# Patient Record
Sex: MALE | Race: BLACK OR AFRICAN AMERICAN | NOT HISPANIC OR LATINO | Employment: UNEMPLOYED | URBAN - METROPOLITAN AREA
[De-identification: names, ages, dates, MRNs, and addresses within clinical notes are randomized per-mention and may not be internally consistent; named-entity substitution may affect disease eponyms.]

---

## 2018-09-18 PROBLEM — I10 HYPERTENSION: Status: ACTIVE | Noted: 2018-09-18

## 2020-07-09 ENCOUNTER — LAB VISIT (OUTPATIENT)
Dept: PRIMARY CARE CLINIC | Facility: OTHER | Age: 55
End: 2020-07-09
Attending: INTERNAL MEDICINE
Payer: MEDICAID

## 2020-07-09 DIAGNOSIS — Z03.818 ENCOUNTER FOR OBSERVATION FOR SUSPECTED EXPOSURE TO OTHER BIOLOGICAL AGENTS RULED OUT: ICD-10-CM

## 2020-07-09 PROCEDURE — U0003 INFECTIOUS AGENT DETECTION BY NUCLEIC ACID (DNA OR RNA); SEVERE ACUTE RESPIRATORY SYNDROME CORONAVIRUS 2 (SARS-COV-2) (CORONAVIRUS DISEASE [COVID-19]), AMPLIFIED PROBE TECHNIQUE, MAKING USE OF HIGH THROUGHPUT TECHNOLOGIES AS DESCRIBED BY CMS-2020-01-R: HCPCS

## 2020-07-12 LAB — SARS-COV-2 RNA RESP QL NAA+PROBE: NEGATIVE

## 2022-01-06 ENCOUNTER — OFFICE VISIT (OUTPATIENT)
Dept: URGENT CARE | Facility: CLINIC | Age: 57
End: 2022-01-06
Payer: OTHER GOVERNMENT

## 2022-01-06 VITALS
SYSTOLIC BLOOD PRESSURE: 186 MMHG | DIASTOLIC BLOOD PRESSURE: 92 MMHG | RESPIRATION RATE: 16 BRPM | HEIGHT: 69 IN | OXYGEN SATURATION: 100 % | WEIGHT: 177 LBS | HEART RATE: 105 BPM | TEMPERATURE: 99 F | BODY MASS INDEX: 26.22 KG/M2

## 2022-01-06 DIAGNOSIS — K04.7 DENTAL ABSCESS: Primary | ICD-10-CM

## 2022-01-06 DIAGNOSIS — T78.3XXA ANGIOEDEMA, INITIAL ENCOUNTER: ICD-10-CM

## 2022-01-06 PROCEDURE — 99214 PR OFFICE/OUTPT VISIT, EST, LEVL IV, 30-39 MIN: ICD-10-PCS | Mod: S$GLB,,, | Performed by: FAMILY MEDICINE

## 2022-01-06 PROCEDURE — 99214 OFFICE O/P EST MOD 30 MIN: CPT | Mod: S$GLB,,, | Performed by: FAMILY MEDICINE

## 2022-01-06 RX ORDER — AMOXICILLIN AND CLAVULANATE POTASSIUM 875; 125 MG/1; MG/1
1 TABLET, FILM COATED ORAL 2 TIMES DAILY
Qty: 20 TABLET | Refills: 0 | Status: SHIPPED | OUTPATIENT
Start: 2022-01-06 | End: 2022-01-16

## 2022-01-06 RX ORDER — NAPROXEN 500 MG/1
500 TABLET ORAL 2 TIMES DAILY WITH MEALS
Qty: 20 TABLET | Refills: 0 | Status: SHIPPED | OUTPATIENT
Start: 2022-01-06

## 2022-01-06 RX ORDER — PREDNISONE 20 MG/1
40 TABLET ORAL DAILY
Qty: 10 TABLET | Refills: 0 | Status: SHIPPED | OUTPATIENT
Start: 2022-01-06 | End: 2022-01-11

## 2022-01-06 NOTE — LETTER
January 6, 2022  Ambrosio Ocampo  [address] [place] [address]                [place] [facility]  [address], [address]  [place] [address]  Phone: [phone]  Fax: [phone] Ambrosio Ocampo was seen and treated in our Urgent Care department on 1/6/2022. He may return to work in 2 - 3 days.      If you have any questions or concerns, please don't hesitate to call.        Sincerely,        Edouard Alcala MD

## 2022-01-06 NOTE — PROGRESS NOTES
"Subjective:       Patient ID: Ambrosio Ocampo is a 56 y.o. male.    Vitals:  height is 5' 9" (1.753 m) and weight is 80.3 kg (177 lb). His oral temperature is 98.6 °F (37 °C). His blood pressure is 186/92 (abnormal) and his pulse is 105. His respiration is 16 and oxygen saturation is 100%.     Chief Complaint: Facial Swelling    Edema  This is a new problem. The current episode started today. The problem occurs constantly. The problem has been gradually worsening. Associated symptoms include neck pain and numbness (lip is numb). Pertinent negatives include no abdominal pain, chest pain, chills, congestion, coughing, fatigue, fever, headaches, sore throat, swollen glands (painful ) or vomiting. Nothing aggravates the symptoms. He has tried nothing for the symptoms.       Constitution: Negative. Negative for chills, fatigue and fever.   HENT: Negative.  Negative for congestion and sore throat.    Neck: Positive for neck pain.   Cardiovascular: Negative.  Negative for chest pain.   Eyes: Negative.    Respiratory: Negative.  Negative for cough.    Gastrointestinal: Negative.  Negative for abdominal pain and vomiting.   Endocrine: negative.   Genitourinary: Negative.    Skin: Negative.    Allergic/Immunologic: Negative.    Neurological: Positive for numbness (lip is numb). Negative for headaches.   Hematologic/Lymphatic: Negative.    Psychiatric/Behavioral: Negative.        Objective:      Physical Exam   Constitutional: He is oriented to person, place, and time. He appears well-developed and well-nourished. He is cooperative.  Non-toxic appearance. He does not have a sickly appearance. He does not appear ill. No distress.   HENT:   Head: Normocephalic and atraumatic.       Ears:   Right Ear: Hearing, tympanic membrane, external ear and ear canal normal.   Left Ear: Hearing, tympanic membrane, external ear and ear canal normal.   Nose: Nose normal. No mucosal edema, rhinorrhea or nasal deformity. No epistaxis. " Right sinus exhibits no maxillary sinus tenderness and no frontal sinus tenderness. Left sinus exhibits no maxillary sinus tenderness and no frontal sinus tenderness.   Mouth/Throat: Uvula is midline, oropharynx is clear and moist and mucous membranes are normal. No trismus in the jaw. Normal dentition. No uvula swelling. No oropharyngeal exudate, posterior oropharyngeal edema or posterior oropharyngeal erythema.   Eyes: Conjunctivae and lids are normal. No scleral icterus.   Neck: Trachea normal and phonation normal. Neck supple. No edema present. No erythema present. No neck rigidity present.   Cardiovascular: Normal rate, regular rhythm, normal heart sounds, intact distal pulses and normal pulses.   Pulmonary/Chest: Effort normal and breath sounds normal. No respiratory distress. He has no decreased breath sounds. He has no rhonchi.   Abdominal: Normal appearance.   Musculoskeletal: Normal range of motion.         General: No deformity or edema. Normal range of motion.   Neurological: He is alert and oriented to person, place, and time. He exhibits normal muscle tone. Coordination normal.   Skin: Skin is warm, dry, intact, not diaphoretic and not pale.   Psychiatric: He has a normal mood and affect. His speech is normal and behavior is normal. Judgment and thought content normal. Cognition and memory  Nursing note and vitals reviewed.        Assessment:       1. Dental abscess    2. Angioedema, initial encounter          Plan:         Dental abscess    Angioedema, initial encounter  -     predniSONE (DELTASONE) 20 MG tablet; Take 2 tablets (40 mg total) by mouth once daily. for 5 days  Dispense: 10 tablet; Refill: 0  -     amoxicillin-clavulanate 875-125mg (AUGMENTIN) 875-125 mg per tablet; Take 1 tablet by mouth 2 (two) times daily. for 10 days  Dispense: 20 tablet; Refill: 0  -     naproxen (NAPROSYN) 500 MG tablet; Take 1 tablet (500 mg total) by mouth 2 (two) times daily with meals.  Dispense: 20 tablet;  Refill: 0     Please drink plenty of fluids.  Please get plenty of rest.  Please return here or go to the Emergency Department for any concerns or worsening of condition.    If you were prescribed antibiotics, please take them to completion.  If you were prescribed a narcotic medication, do not drive or operate heavy equipment or machinery while taking these medications.    Ice Packs to face and jaw will help reduce swelling and pain.    If not allergic, please take over the counter Tylenol (Acetaminophen) and/or Motrin (Ibuprofen) as directed for control of pain and/or fever.    You will need to see a dentist for further treatment of this problem.  Emergency Dentistry is available and listed below.    Dentist - Emergency [place]    Dr. Esequiel Casey  DDS  [address], [place]  [address] [phone]    Dentist - Emergency    Dr. Perera DDS  [address].  [place], [place]    [phone]      Please follow up with your primary care doctor or specialist as needed.  Primary Doctor No  None    You must understand that you have received treatment at an Urgent Care facility only, and that you may be  released before all of your medical problems are known or treated. Urgent Care facilities are not equipped to  handle life threatening emergencies. It is recommended that you seek care at an Emergency Department for  further evaluation of worsening or concerning symptoms, or possibly life threatening conditions as  discussed.

## 2022-01-07 NOTE — PATIENT INSTRUCTIONS
STOP LISINOPRIL, CONTACT YOUR DOCTOR TOMORROW FOR CHANGE OF MEDICATION.    Please drink plenty of fluids.  Please get plenty of rest.  Please return here or go to the Emergency Department for any concerns or worsening of condition.    If you were prescribed antibiotics, please take them to completion.  If you were prescribed a narcotic medication, do not drive or operate heavy equipment or machinery while taking these medications.    Ice Packs to face and jaw will help reduce swelling and pain.    If not allergic, please take over the counter Tylenol (Acetaminophen) and/or Motrin (Ibuprofen) as directed for control of pain and/or fever.    You will need to see a dentist for further treatment of this problem.  Emergency Dentistry is available and listed below.    Dentist - Emergency [place]    Dr. Esequiel Casey  DDS  [address], [place]  [address] [phone]    Dentist - Emergency    Dr. Perera DDS  [address], [place]    [phone]        Please follow up with your primary care doctor or specialist as needed.  Primary Doctor No  None    You must understand that you have received treatment at an Urgent Care facility only, and that you may be  released before all of your medical problems are known or treated. Urgent Care facilities are not equipped to  handle life threatening emergencies. It is recommended that you seek care at an Emergency Department for  further evaluation of worsening or concerning symptoms, or possibly life threatening conditions as  discussed.    Dental Abscess  An abscess is a sac of pus. A dental abscess forms when a tooth or the tissue around it becomes infected with bacteria. The bacteria can enter through a cavity or a crack in a tooth. It can also infect the gum tissue or bone around a tooth. An untreated abscess can cause the loss of the tooth. It can even spread to other parts of the body and become life-threatening.    Symptoms of a dental abscess   Signs of  a dental abscess include:  · Toothache, often severe  · Tooth pain with hot, cold, or pressure  · Pain in the gums, cheek, or jaw  · Bad breath or bitter taste in the mouth  · Trouble swallowing or opening the mouth  · Fever  · Swollen or enlarged glands in the neck  Diagnosing a dental abscess  An abscess is diagnosed by looking at your teeth and gums. You will be told if any tests, like dental X-rays, are needed.  Treating a dental abscess  Treatments for a dental abscess may include the following:  · Antibiotic medicines to treat the underlying infection.  · Pain relievers to help you feel more comfortable. Your health care provider may prescribe a medicine for you. Or, use over-the-counter pain relievers, like acetaminophen or ibuprofen.  · Warm saltwater rinses to soothe discomfort and help clear away pus.  · Root canal surgery if needed to save the tooth. With a root canal, the infected part of the tooth is removed. A special substance is then used to fill the empty space in the tooth.  · Drainage of the abscess if needed. Incisions are made to allow the infected material to drain from the tooth.  · Removal of the tooth in cases of severe infection that cant be treated another way.  If the infection is severe, has spread, or doesnt respond to treatment, you may need to be admitted to a hospital.        When to call the dentist  Call your dentist right away if you have any of the following:  · Fever of 100.4°F (38°C) or higher  · Increased pain, redness, drainage, or swelling in the treated area  · Swelling of the face or jawbone  · Pain that cannot be controlled with medicines   Preventing dental abscess  To prevent another abscess in the future, keep your teeth clean and healthy. Brush twice a day and floss at least once daily. See your dentist for regular tooth cleanings. And avoid sugary foods and drinks that can lead to tooth decay.  Date Last Reviewed: 7/14/2015  © 4348-9100 [employer]  [address] [place] [address]. All rights reserved. This information is not intended as a substitute for professional medical care. Always follow your healthcare professional's instructions.        Please drink plenty of fluids.  Please get plenty of rest.  Please return here or go to the Emergency Department for any concerns or worsening of condition.  If you were given wait & see antibiotics, please wait 3-5 days before taking them, and only take them if your symptoms have worsened or not improved.  If you do begin taking the antibiotics, please take them to completion.  If you were prescribed antibiotics, please take them to completion.  If you were prescribed a narcotic medication, do not drive or operate heavy equipment or machinery while taking these medications.      If not allergic, please take over the counter Tylenol (Acetaminophen) and/or Motrin (Ibuprofen) as directed for control of pain and/or fever.    Please follow up with your primary care doctor or specialist as needed.  Primary Doctor No  None    You must understand that you have received treatment at an Urgent Care facility only, and that you may be  released before all of your medical problems are known or treated. Urgent Care facilities are not equipped to  handle life threatening emergencies. It is recommended that you seek care at an Emergency Department for  further evaluation of worsening or concerning symptoms, or possibly life threatening conditions as  discussed.    Patient Education       Angioedema Discharge Instructions   About this topic   Angioedema is swelling under the skin and tissues. It is caused by a leaking blood vessel. Hives are swelling on the top of the skin. You may have both of these at the same time. Sometimes, you may only have angioedema. The swelling can happen any place on the body. Some people have swelling on the face, tongue, lips, or throat. Other people have swelling on the arms, legs, belly, and  lungs. Doctors do not always know what causes this swelling. Sometimes, it is an allergy to drugs, foods, animal fur, or bug bites. There may be something with the weather or the flowers and trees outside that is causing the swelling.  It is important to tell hives from angioedema because the care is different. Prevention is the most important step against angioedema. When the face, throat, and lungs are affected, you should see a doctor right away.  What care is needed at home?   · Ask your doctor what you need to do when you go home. Make sure you ask questions if you do not understand what the doctor says. This way you will know what you need to do.  · You doctor will give you drugs for pain and swelling. Take all the drugs as ordered by your doctor.  · During an attack, loosen tight clothing.  · If you are itchy, you may put a cool damp washcloth over the itchy part. Place an ice pack or a bag of frozen peas wrapped in a towel over the itchy part. Never put ice right on the skin. Do not leave the ice on more than 10 to 15 minutes at a time.  · If your angioedema is caused by heat, stay in a cool room with good air flow.  · Put an air purifier in your room. This may help filter the air in your room.  · Keep a list of what sets off your allergies. Stay away from them as much as possible.  ? Wear a mask when you need to go to giraldo and other crowded places if your trigger is airborne allergens.  ? When eating in a restaurant, always ask about the ingredients of a dish you want to have if your trigger for angioedema is food-related (like MSG).  · Tell close friends and companions about your condition so that they may help you if needed.  · The drugs may make you feel tired. Do not drive or operate machinery if you feel drowsy.  What follow-up care is needed?   · Your condition needs close monitoring. Your doctor may ask you to make visits to the office to check on your progress. Be sure to keep these visits.  · You  may be asked to take a different kind of heart drug if the doctor feels that was causing your problem.  · Your doctor will tell you if other tests are needed.  · Your doctor may have you go see an allergy expert called an immunologist.  · You may have to go see a dietitian. This is someone who can help you find out if certain foods are causing the angioedema.  What drugs may be needed?   The doctor may order drugs to:  · Help with pain and swelling  · Ease itching  · Fight allergies  · Fight or prevent an infection  · Help prevent a reaction  Your doctor may also switch you to different kinds of drugs for your other medical problems.  Will physical activity be limited?   If your angioedema is triggered by exercise, take your drugs before exercising.  What problems could happen?   · Breathing problems  · Heart problems  · Infection  · Angioedema returns  What can be done to prevent this health problem?   · Do not take any drugs unless ordered by your doctor.  · Check with your doctor before taking over-the-counter (OTC) drugs, health additives, or herbs.  · Do not take aspirin for a headache or other problems unless ordered by your doctor.  · Avoid things that may set off your swelling. This may include things like getting too hot, eating spicy food, or drinking alcohol.  When do I need to call the doctor?   · Swollen lips or throat  · Sudden breathing problems  · Chest pain  · Upset stomach and throwing up  · Fast heartbeat  · Fever of 100.4°F (38°C) or higher  · You are not feeling better in 2 to 3 days or you are feeling worse  Teach Back: Helping You Understand   The Teach Back Method helps you understand the information we are giving you. After you talk with the staff, tell them in your own words what you learned. This helps to make sure the staff has described each thing clearly. It also helps to explain things that may have been confusing. Before going home, make sure you can do these:  · I can tell you the  "difference between angioedema and hives.  · I can tell you what may help me feel better during an attack.  · I can tell you what I will do if I suddenly have trouble breathing.  Where can I learn more?   American Academy of Allergy Asthma & Immunology  https://www.aaaai.org/conditions-and-treatments/library/allergy-library/allergic-skin-conditions   Australasian Society of Clinical Immunology and Allergy  https://allergy.org.au/patients/skin-allergy/angioedema   World Allergy Organization  https://www.worldallergy.org/education-and-programs/education/allergic-disease-resource-center/professionals/angioedema   Last Reviewed Date   2021-03-08  Consumer Information Use and Disclaimer   This information is not specific medical advice and does not replace information you receive from your health care provider. This is only a brief summary of general information. It does NOT include all information about conditions, illnesses, injuries, tests, procedures, treatments, therapies, discharge instructions or life-style choices that may apply to you. You must talk with your health care provider for complete information about your health and treatment options. This information should not be used to decide whether or not to accept your health care providers advice, instructions or recommendations. Only your health care provider has the knowledge and training to provide advice that is right for you.  Copyright   Copyright © 2021 UpToDate, Inc. and its affiliates and/or licensors. All rights reserved.    Patient Education       Angioedema Caused by ACE Inhibitor Medicines   The Basics   Written by the doctors and editors at [employer]   What are ACE inhibitors? -- ACE inhibitors are medicines that are most often used to treat high blood pressure, but are also used for other problems. "ACE inhibitor" is short for "angiotensin-converting enzyme inhibitor." Examples of ACE inhibitors include enalapril, captopril, and lisinopril.  What " is angioedema? -- Angioedema is the medical term for swelling of the tissue under the skin. This can be caused by different things, including an allergic reaction to certain medicines. ACE inhibitors are 1 type of medicine that can cause this reaction in some people.  The swelling can happen within a few weeks of starting the medicine. But in some cases, it can happen even after months or years of taking an ACE inhibitor every day without any swelling. Doctors are not sure why this happens.  The swelling usually lasts a few days and is not itchy.  What part of the body swells? -- The swelling can happen on the lips, face, or tongue. It can also affect other parts of the body, including the intestines.  Depending on where the swelling is, it can cause different problems:  · Swelling of the tongue, mouth, and lips can be very dangerous if it makes it hard for you to breathe or swallow. You should go to the emergency department right away if you get swelling in or near your mouth or throat. If you are having trouble breathing, you might need to call for an ambulance (in the [place] and [place], dial [id number]).   In the hospital, the doctors will monitor your breathing. If the swelling is causing your throat to close, they can put a tube in your throat to help you breathe. You will stay in the hospital until the swelling goes down.  · If swelling happens in the intestines, it can cause pain, vomiting, or diarrhea.  What should I do if I take an ACE inhibitor and have swelling? -- You should:  · Stop taking the medicine, and tell your doctor.  · Never take that medicine or any other ACE inhibitor in the future.  · Make sure your doctor puts a note in your medical record that you are allergic to ACE inhibitors.  · Make sure your main doctor, other doctors and nurses who take care of you, and your pharmacy know that you should never take ACE inhibitors again.  Can the swelling happen again after I have stopped the ACE  "inhibitor? -- Yes. You might get swelling again 1 or more times within the next few months. The medicine affects your body for a while, even after you stop taking it.  If you get swelling around your mouth or tongue again, go to the emergency department so doctors can monitor your breathing.  The swelling should stop happening after a few months. If it does not, talk to your doctor. You might need to see an allergy specialist, because there may be another reason that you are still swelling. Sometimes people have swelling caused by another problem that the ACE inhibitor makes worse.  What medicine should I switch to? -- There are many other medicines for high blood pressure and heart problems that do not cause swelling. Ask your doctor what you can take instead of an ACE inhibitor. Medicines that do not cause swelling include angiotensin II receptor blockers ("ARBs"), beta blockers, and calcium channel blockers.  Remember that if you get swelling again in the first few months after stopping an ACE inhibitor, it is probably still related to the ACE inhibitor. That's because ACE inhibitors continue to affect your body for a while, even after you stop taking them.  All topics are updated as new evidence becomes available and our peer review process is complete.  This topic retrieved from [employer] on: Sep 21, 2021.  Topic 999419 Version 1.0  Release: 29.4.2 - C29.263  © 2021 UpToDate, Inc. and/or its affiliates. All rights reserved.  Consumer Information Use and Disclaimer   This information is not specific medical advice and does not replace information you receive from your health care provider. This is only a brief summary of general information. It does NOT include all information about conditions, illnesses, injuries, tests, procedures, treatments, therapies, discharge instructions or life-style choices that may apply to you. You must talk with your health care provider for complete information about your health and " treatment options. This information should not be used to decide whether or not to accept your health care provider's advice, instructions or recommendations. Only your health care provider has the knowledge and training to provide advice that is right for you. The use of this information is governed by the [employer] End User License Agreement, available at https://www.[employer]/en/solutions/[employer]/about/argenis.The use of [employer] content is governed by the [employer] Terms of Use. ©2021 [employer] All rights reserved.  Copyright   © 2021 UpToDate, Inc. and/or its affiliates. All rights reserved.

## 2023-01-12 ENCOUNTER — OFFICE VISIT (OUTPATIENT)
Dept: URGENT CARE | Facility: CLINIC | Age: 58
End: 2023-01-12
Payer: OTHER GOVERNMENT

## 2023-01-12 VITALS
HEIGHT: 69 IN | SYSTOLIC BLOOD PRESSURE: 176 MMHG | HEART RATE: 102 BPM | DIASTOLIC BLOOD PRESSURE: 97 MMHG | OXYGEN SATURATION: 96 % | TEMPERATURE: 100 F | WEIGHT: 17 LBS | BODY MASS INDEX: 2.52 KG/M2 | RESPIRATION RATE: 20 BRPM

## 2023-01-12 DIAGNOSIS — J10.1 INFLUENZA A: Primary | ICD-10-CM

## 2023-01-12 DIAGNOSIS — R05.9 COUGH, UNSPECIFIED TYPE: ICD-10-CM

## 2023-01-12 LAB
CTP QC/QA: YES
CTP QC/QA: YES
POC MOLECULAR INFLUENZA A AGN: POSITIVE
POC MOLECULAR INFLUENZA B AGN: NEGATIVE
SARS-COV-2 AG RESP QL IA.RAPID: NEGATIVE

## 2023-01-12 PROCEDURE — 99214 PR OFFICE/OUTPT VISIT, EST, LEVL IV, 30-39 MIN: ICD-10-PCS | Mod: S$GLB,,,

## 2023-01-12 PROCEDURE — 71046 XR CHEST PA AND LATERAL: ICD-10-PCS | Mod: S$GLB,,, | Performed by: RADIOLOGY

## 2023-01-12 PROCEDURE — 87502 POCT INFLUENZA A/B MOLECULAR: ICD-10-PCS | Mod: QW,S$GLB,,

## 2023-01-12 PROCEDURE — 87811 SARS CORONAVIRUS 2 ANTIGEN POCT, MANUAL READ: ICD-10-PCS | Mod: QW,S$GLB,,

## 2023-01-12 PROCEDURE — 87811 SARS-COV-2 COVID19 W/OPTIC: CPT | Mod: QW,S$GLB,,

## 2023-01-12 PROCEDURE — 99214 OFFICE O/P EST MOD 30 MIN: CPT | Mod: S$GLB,,,

## 2023-01-12 PROCEDURE — 87502 INFLUENZA DNA AMP PROBE: CPT | Mod: QW,S$GLB,,

## 2023-01-12 PROCEDURE — 71046 X-RAY EXAM CHEST 2 VIEWS: CPT | Mod: S$GLB,,, | Performed by: RADIOLOGY

## 2023-01-12 RX ORDER — OSELTAMIVIR PHOSPHATE 75 MG/1
75 CAPSULE ORAL 2 TIMES DAILY
Qty: 10 CAPSULE | Refills: 0 | Status: SHIPPED | OUTPATIENT
Start: 2023-01-12 | End: 2023-01-17

## 2023-01-12 RX ORDER — BENZONATATE 200 MG/1
200 CAPSULE ORAL 3 TIMES DAILY PRN
Qty: 30 CAPSULE | Refills: 0 | Status: SHIPPED | OUTPATIENT
Start: 2023-01-12 | End: 2023-01-22

## 2023-01-12 RX ORDER — PROMETHAZINE HYDROCHLORIDE AND DEXTROMETHORPHAN HYDROBROMIDE 6.25; 15 MG/5ML; MG/5ML
5 SYRUP ORAL EVERY 4 HOURS PRN
Qty: 180 ML | Refills: 0 | Status: CANCELLED | OUTPATIENT
Start: 2023-01-12 | End: 2023-01-22

## 2023-01-12 NOTE — PATIENT INSTRUCTIONS
You must understand that you have received treatment at an Urgent Care facility only, and that you may be  released before all of your medical problems are known or treated. Urgent Care facilities are not equipped to  handle life threatening emergencies. It is recommended that you seek care at an Emergency Department for  further evaluation of worsening or concerning symptoms, or possibly life threatening conditions as  discussed.     Strong peripheral pulses/Capillary refill less/equal to 2 seconds

## 2023-01-12 NOTE — PROGRESS NOTES
"Subjective:       Patient ID: Ambrosio Ocampo is a 57 y.o. male.    Vitals:  height is 5' 9" (1.753 m) and weight is 7.711 kg (17 lb). His oral temperature is 99.8 °F (37.7 °C). His blood pressure is 176/97 (abnormal) and his pulse is 102. His respiration is 20 and oxygen saturation is 96%.     Chief Complaint: Chest Pain    Patient complains about having chest pain on right side with deep breath, chills, headache, fever, sinus congestion, runny nose, shortness of breath and cough for 2 days.  Patient has history of hypertension, stated he took his medications this morning.    Chest Pain   This is a new problem. The current episode started in the past 7 days. Onset quality: when coughing and breathing in. The problem occurs constantly. The problem has been gradually worsening. Pain location: right sideof the chest. The pain is at a severity of 8/10. The pain is moderate. Quality: throbbing, aching. Radiates to: makes the whole body weak. Associated symptoms include a cough, a fever (unmeasured), headaches and shortness of breath (mild, able to walk without SOB). Pertinent negatives include no nausea or vomiting. The cough's precipitants include activity. Cough characteristics: yellow green. Nothing relieves the cough. The cough is worsened by activity. Treatments tried: thera flu. The treatment provided mild relief.   Pertinent negatives for past medical history include no CAD.     Constitution: Positive for chills and fever (unmeasured).        +bodyaches     HENT:  Positive for congestion and postnasal drip.    Cardiovascular:  Positive for chest pain (with deep breathing).   Respiratory:  Positive for cough and shortness of breath (mild, able to walk without SOB).    Gastrointestinal:  Negative for nausea, vomiting and diarrhea.   Neurological:  Positive for headaches.     Objective:      Physical Exam   Constitutional: He is oriented to person, place, and time. He appears well-developed. He is " cooperative.  Non-toxic appearance. He does not appear ill. No distress.      Comments:Patient is alert and able to answer questions with ease. Patient ambulated in clinic without difficulty.      HENT:   Head: Normocephalic and atraumatic.   Ears:   Right Ear: Hearing, tympanic membrane, external ear and ear canal normal.   Left Ear: Hearing, tympanic membrane, external ear and ear canal normal.   Nose: Rhinorrhea (clear) and congestion present. No mucosal edema or nasal deformity. No epistaxis. Right sinus exhibits no maxillary sinus tenderness and no frontal sinus tenderness. Left sinus exhibits no maxillary sinus tenderness and no frontal sinus tenderness.   Mouth/Throat: Uvula is midline, oropharynx is clear and moist and mucous membranes are normal. No trismus in the jaw. Normal dentition. No uvula swelling. No oropharyngeal exudate, posterior oropharyngeal edema or posterior oropharyngeal erythema.   Eyes: Conjunctivae and lids are normal. No scleral icterus.   Neck: Trachea normal and phonation normal. Neck supple. No edema present. No erythema present. No neck rigidity present.   Cardiovascular: Normal rate, regular rhythm, normal heart sounds and normal pulses.   Pulmonary/Chest: Effort normal. No respiratory distress. He has no decreased breath sounds. He has no wheezes. He has no rhonchi.         Comments: Patient able to take in deep breath. No intercostal retractions.    Abdominal: Normal appearance.   Musculoskeletal: Normal range of motion.         General: No deformity. Normal range of motion.   Neurological: He is alert and oriented to person, place, and time. He exhibits normal muscle tone. Coordination normal.   Skin: Skin is warm, dry, intact, not diaphoretic and not pale.   Psychiatric: His speech is normal and behavior is normal. Judgment and thought content normal.   Nursing note and vitals reviewed.      Results for orders placed or performed in visit on 01/12/23   SARS Coronavirus 2  Antigen, POCT Manual Read   Result Value Ref Range    SARS Coronavirus 2 Antigen Negative Negative     Acceptable Yes    POCT Influenza A/B MOLECULAR   Result Value Ref Range    POC Molecular Influenza A Ag Positive (A) Negative, Not Reported    POC Molecular Influenza B Ag Negative Negative, Not Reported     Acceptable Yes      XR CHEST PA AND LATERAL    Result Date: 1/12/2023  EXAMINATION: XR CHEST PA AND LATERAL TECHNIQUE: PA and lateral views of the chest were performed. COMPARISON: Chest radiograph 4/5/99 FINDINGS: Trachea is relatively midline and patent.The lungs are well expanded and clear, with normal appearance of pulmonary vasculature and no pleural effusion or pneumothorax. The cardiac silhouette is normal in size. The hilar and mediastinal contours are unremarkable. Osseous structures show age-related mild degenerative change and mild dextrocurvature of the thoracolumbar spine without acute process seen.     No radiographic evidence of pneumonia or other source of shortness of breath, noting that early/mild viral pneumonia or nonspecific bronchitis may be radiographically occult. Electronically signed by: Mahin Hargrove MD Date:    01/12/2023 Time:    12:31     Assessment:       1. Influenza A    2. Cough, unspecified type          Plan:         Influenza A  -     oseltamivir (TAMIFLU) 75 MG capsule; Take 1 capsule (75 mg total) by mouth 2 (two) times daily. for 5 days  Dispense: 10 capsule; Refill: 0  -     benzonatate (TESSALON) 200 MG capsule; Take 1 capsule (200 mg total) by mouth 3 (three) times daily as needed for Cough.  Dispense: 30 capsule; Refill: 0    Cough, unspecified type  -     SARS Coronavirus 2 Antigen, POCT Manual Read  -     POCT Influenza A/B MOLECULAR  -     XR CHEST PA AND LATERAL; Future; Expected date: 01/12/2023       Reviewed CXR in PACS- no acute consolidation noted or pneumothorax. No acute cardiopulmonary findings. Discussed findings with patient.      Patient's BP is elevated in clinic. Patient stated he took his BP medication this morning, unsure of which medication it was. Discussed with patient to take BP reading twice a day at home and keep a BP log. Bring log to PCP if medication needs adjustment. Close PCP follow up.    If symptoms worsen or do not improve, strict ER precautions given to patient. Patient verbalized understanding.       Discussed with patient the importance of f/u with their primary care provider. Urged to go to the ER for any worsening signs or symptoms.

## 2023-01-13 ENCOUNTER — TELEPHONE (OUTPATIENT)
Dept: URGENT CARE | Facility: CLINIC | Age: 58
End: 2023-01-13
Payer: OTHER GOVERNMENT

## 2023-01-13 NOTE — TELEPHONE ENCOUNTER
Pharmacy called stating that the pt is having trouble getting the tessalon pearls and tamiflu prescribed yesterday. The provider isn't registered with medicaid. [place] gave the okay from them to put it under their name. Pharmacist said she would try to run it through, if she runs into any problems she will give us a call back.

## 2023-03-23 ENCOUNTER — OFFICE VISIT (OUTPATIENT)
Dept: URGENT CARE | Facility: CLINIC | Age: 58
End: 2023-03-23
Payer: OTHER GOVERNMENT

## 2023-03-23 VITALS
HEART RATE: 92 BPM | OXYGEN SATURATION: 97 % | WEIGHT: 175 LBS | SYSTOLIC BLOOD PRESSURE: 181 MMHG | TEMPERATURE: 98 F | DIASTOLIC BLOOD PRESSURE: 96 MMHG | BODY MASS INDEX: 25.92 KG/M2 | RESPIRATION RATE: 18 BRPM | HEIGHT: 69 IN

## 2023-03-23 DIAGNOSIS — S93.402A SPRAIN OF LEFT ANKLE, UNSPECIFIED LIGAMENT, INITIAL ENCOUNTER: Primary | ICD-10-CM

## 2023-03-23 PROCEDURE — 99214 PR OFFICE/OUTPT VISIT, EST, LEVL IV, 30-39 MIN: ICD-10-PCS | Mod: S$GLB,,, | Performed by: FAMILY MEDICINE

## 2023-03-23 PROCEDURE — 99214 OFFICE O/P EST MOD 30 MIN: CPT | Mod: S$GLB,,, | Performed by: FAMILY MEDICINE

## 2023-03-23 RX ORDER — INDOMETHACIN 50 MG/1
50 CAPSULE ORAL 3 TIMES DAILY PRN
Qty: 30 CAPSULE | Refills: 0 | Status: SHIPPED | OUTPATIENT
Start: 2023-03-23 | End: 2023-04-02

## 2023-03-23 NOTE — PATIENT INSTRUCTIONS
Please drink plenty of fluids.  Please get plenty of rest.  Please return here or go to the Emergency Department for any concerns or worsening of condition.  If you were prescribed a narcotic medication, do not drive or operate heavy equipment or machinery while taking these medications.  If you were not prescribed an anti-inflammatory medication, and if you do not have any history of stomach/intestinal ulcers, or kidney disease, or are not taking a blood thinner such as Coumadin, Plavix, Pradaxa, Eloquis, or Xaralta for example, it is OK to take over the counter Ibuprofen or Advil or Motrin or Aleve as directed.  Do not take these medications on an empty stomach.  Rest, ice, compression and elevation to the affected joint or limb as needed.    If you were given or placed in a brace, wear it until your follow up visit or recheck.  If you  smoke, please stop smoking.       If you were instructed to use crutches, use them for all ambulation.  You may walk on the injured leg with the crutches.  Use them to keep some of your weight off the injured leg until your follow up visit.    Please follow up with your primary care doctor or specialist as needed.    Primary Doctor No  None    You must understand that you have received treatment at an Urgent Care facility only, and that you may be  released before all of your medical problems are known or treated. Urgent Care facilities are not equipped to  handle life threatening emergencies. It is recommended that you seek care at an Emergency Department for  further evaluation of worsening or concerning symptoms, or possibly life threatening conditions as  discussed.      Ankle Sprain, with X-Ray   A sprain is an injury to the ligaments that hold the ankle joint together. There are no broken bones. Most sprains take about 4 to 6 weeks to heal. A severe sprain, where the ligament is completely torn, can take several months to recover.  Mild to moderate sprains may be treated with  an elastic wrap or an in-shoe splint. This will provide support and prevent re-injury. A mild sprain may not need any additional support. A severe sprain may require surgery to repair. In some cases a cast or boot may be needed.  Home care  Stay off your injured ankle as much as possible until you can walk on it without pain. If you have a lot of pain with walking, then crutches or a walker may be prescribed. These can be rented or purchased at many pharmacies and surgical or orthopedic supply stores. Follow your healthcare providers advice about when to begin bearing weight on that leg.  Keep your leg elevated to reduce pain and swelling. When sleeping, place a pillow under your injured ankle. When sitting, support your injured ankle and leg so they are level with your waist. This is very important during the first 48 hours.  Place an ice pack over the injured area for no more than 20 minutes. Do this every 3 to 6 hours for the first 24 to 48 hours, or as instructed. To make an ice pack, put ice cubes in a plastic bag that seals at the top. Wrap the bag in a clean, thin towel or cloth. You can place the ice pack directly over the wrap, splint, or cast. Never place an ice pack directly on your skin. As the ice melts, be careful not to get any wrap, splint, or cast wet. Keep using ice packs as needed to ease pain and swelling.    If you have a boot, open it to apply an ice pack, unless told otherwise by your provider. Wrap the ice pack in a clean, thin towel or cloth. Never put an ice pack directly on your skin.  After 48 hours, it may also be helpful to apply heat for no more than 20 minutes, several times a day. You can do this with a heating pad or warm compress. Or you may want to go back and forth between using ice and heat. Never apply heat directly to the skin. Always wrap the heating pad or warm compress in a clean, thin towel or cloth.  You may use over-the-counter pain medicine to ease pain, unless another  pain medicine was prescribed. Talk with your provider before using these medicines if you have chronic liver or kidney disease, or have ever had a stomach ulcer or GI (gastrointestinal) bleeding.  You may return to sports after your ankle heals, when you can run without pain.  You are at risk of getting injured again for the first 6 weeks. During that time, protect your ankle with an in-shoe splint that prevents your ankle from tilting side to side. This is very important if you do active work or play sports during that time.  Follow-up care  Any X-rays you had today dont show any broken bones, breaks, or fractures. Bruises and sprains can sometimes hurt as much as a fracture. These injuries can take time to heal completely. If your symptoms dont improve or they get worse, talk with your healthcare provider. You may need a repeat X-ray.  When to seek medical advice  Call your healthcare provider right away if any of these occur:  The plaster cast or splint gets wet or soft  The fiberglass cast or splint gets wet and does not dry for 24 hours  The pain or swelling increases, or redness appears  The cast has a bad smell  Fever of 100.4 F (38 C) or higher, or as directed  Your toes become cold, blue, numb, or tingly  You re-injure your ankle  Date Last Reviewed: 11/20/2015  © 0309-1518 [employer]. [address] [place] [address]. All rights reserved. This information is not intended as a substitute for professional medical care. Always follow your healthcare professional's instructions.      Understanding Ankle Sprain    The ankle is the joint where the leg and foot meet. Bones are held in place by connective tissue called ligaments. When ankle ligaments are stretched to the point of pain and injury, it is called an ankle sprain. A sprain can tear the ligaments. These tears can be very small but still cause pain. Ankle sprains can be mild or severe.  What causes an ankle sprain?  A sprain may  occur when you twist your ankle or bend it too far. This can happen when you stumble or fall. Things that can make an ankle sprain more likely include:  Having had an ankle sprain before  Playing sports that involve running and jumping. Or playing contact sports such as football or hockey.  Wearing shoes that dont support your feet and ankles well  Having ankles with poor strength and flexibility  Symptoms of an ankle sprain  Symptoms may include:  Pain or soreness in the ankle  Swelling  Redness or bruising  Not being able to walk or put weight on the affected foot  Reduced range of motion in the ankle  A popping or tearing feeling at the time the sprain occurs  An abnormal or dislocated look to the ankle  Instability or too much range of motion in the ankle  Treatment for an ankle sprain  Treatment focuses on reducing pain and swelling, and avoiding further injury. Treatments may include:  Resting the ankle. Avoid putting weight on it. This may mean using crutches until the sprain heals.  Prescription or over-the-counter pain medicines. These help reduce swelling and pain.  Cold packs. These help reduce pain and swelling.  Raising your ankle above your heart. This helps reduce swelling.  Wrapping the ankle with an elastic bandage or ankle brace. This helps reduce swelling and gives some support to the ankle. In rare cases, you may need a cast or boot.  Stretching and other exercises. These improve flexibility and strength.  Heat packs. These may be recommended before doing ankle exercises.  Possible complications of an ankle sprain  An ankle that has been weakened by a sprain can be more likely to have repeated sprains afterward. Doing exercises to strengthen your ankle and improve balance can reduce your risk for repeated sprains. Other possible complications are long-term (chronic) pain or an ankle that remains unstable.  When to call your healthcare provider  Call your healthcare provider right away if you  have any of these:  Fever of 100.4°F (38°C) or higher, or as directed  Pain, numbness, discoloration, or coldness in the foot or toes  Pain that gets worse  Symptoms that dont get better, or get worse  New symptoms   Date Last Reviewed: 3/10/2016  © 0325-3896 [employer]. [address] [place] [address], [place] [address]. All rights reserved. This information is not intended as a substitute for professional medical care. Always follow your healthcare professional's instructions.      Discharge Instructions: Using Crutches (Weight-Bearing)  Your healthcare provider has prescribed crutches for you. A healthy leg can support your body weight, but when you have an injured leg or foot, you need to keep weight off it. Once you are told that you can put some weight on your leg, use a weight-bearing method of walking as the leg heals. Depending on your arm strength and balance, you can either step to or step through. Practice will help you learn to step through so that you can cover more ground with each step.      Before you use crutches  Be prepared with the following tips:  Remove throw rugs, electrical cords, and anything else that may cause you to fall.  Arrange your household to keep the items you need handy. Keep everything else out of the way.  Find a backpack, nicole pack, or apron, or use pockets to carry things. This will help you keep your hands free.  Standing with crutches  Use the balanced standing (tripod) position when you start or end a movement. Also use it whenever you're standing for a length of time.  Move your crutches in front of you about 12 inches.  Find your balance.  Be sure not to rest your armpits on the pads.  Walking with crutches  Follow these tips:  Start in a balanced standing (tripod) position.  Step forward with your affected foot.  Land lightly between your crutches.  Squeeze the pads against the sides of your chest.  Support your weight with your hands and your affected  leg.  Press down on the handgrips.  Step to  This method includes the following:  Lift your unaffected foot and step to the crutches.  Land on your unaffected foot, between your crutches.  Keep the knee slightly bent.  Reach forward and out with the crutches to begin the next step.  Step through  This method includes the following:  Lift the unaffected foot.  Step forward through the crutches.  Land on the unaffected foot, with the heel slightly in front of the toe of the other foot.  Keep the knee slightly bent.  Reach forward and out with the crutches to begin the next step.  Follow-up  Make a follow-up appointment as directed by your healthcare provider.     When to call your healthcare provider  Call your healthcare provider right away if you have any of the following:  Sudden or increased shortness of breath  Sudden chest pain or localized chest pain with coughing  Fever above 100.4°F (38.0°C)  Increasing redness, tenderness, or swelling at theincision site or in the injured limb  Drainage from the incision or injured limb  Opening of the incision or injury  Increasing pain, with or without activity   Date Last Reviewed: 8/1/2016  © 6792-9943 [employer]. [address] [place] [address]. All rights reserved. This information is not intended as a substitute for professional medical care. Always follow your healthcare professional's instructions.

## 2023-03-23 NOTE — PROGRESS NOTES
"Subjective:       Patient ID: Ambrosio Ocampo is a 57 y.o. male.    Vitals:  height is 5' 9" (1.753 m) and weight is 79.4 kg (175 lb). His oral temperature is 98.3 °F (36.8 °C). His blood pressure is 181/96 (abnormal) and his pulse is 92. His respiration is 18 and oxygen saturation is 97%.     Chief Complaint: Foot Swelling    Pt states he does not know how he hurt his foot.    Foot Injury   The incident occurred 2 days ago. There was no injury mechanism. The pain is present in the left foot. The quality of the pain is described as aching and stabbing. The pain is at a severity of 9/10. The pain is moderate. The pain has been Constant since onset. Associated symptoms include numbness and tingling. Nothing aggravates the symptoms. Treatments tried: ibuprofen. The treatment provided no relief.     Constitution: Negative.   HENT: Negative.     Cardiovascular: Negative.    Eyes: Negative.    Respiratory: Negative.     Gastrointestinal: Negative.    Endocrine: negative.   Genitourinary: Negative.    Musculoskeletal: Negative.    Skin: Negative.    Allergic/Immunologic: Negative.    Neurological:  Positive for numbness.   Hematologic/Lymphatic: Negative.    Psychiatric/Behavioral: Negative.       Objective:      Physical Exam   Constitutional: He is oriented to person, place, and time. He appears well-developed. He is cooperative.  Non-toxic appearance. He does not appear ill. No distress.   HENT:   Head: Normocephalic and atraumatic. Head is without abrasion, without contusion and without laceration.   Ears:   Right Ear: Hearing, tympanic membrane, external ear and ear canal normal. No hemotympanum.   Left Ear: Hearing, tympanic membrane, external ear and ear canal normal. No hemotympanum.   Nose: Nose normal. No mucosal edema, rhinorrhea or nasal deformity. No epistaxis. Right sinus exhibits no maxillary sinus tenderness and no frontal sinus tenderness. Left sinus exhibits no maxillary sinus tenderness and no " frontal sinus tenderness.   Mouth/Throat: Uvula is midline, oropharynx is clear and moist and mucous membranes are normal. No trismus in the jaw. Normal dentition. No uvula swelling. No posterior oropharyngeal erythema.   Eyes: Conjunctivae, EOM and lids are normal. Pupils are equal, round, and reactive to light. Right eye exhibits no discharge. Left eye exhibits no discharge. No scleral icterus.   Neck: Trachea normal and phonation normal. Neck supple. No tracheal deviation present. No neck rigidity present. No spinous process tenderness present. No muscular tenderness present.   Cardiovascular: Normal rate, regular rhythm, normal heart sounds and normal pulses.   Pulmonary/Chest: Effort normal and breath sounds normal. No respiratory distress.   Abdominal: Normal appearance and bowel sounds are normal. He exhibits no distension and no mass. Soft. There is no abdominal tenderness.   Musculoskeletal:         General: No deformity.      Left ankle: He exhibits decreased range of motion and swelling. Tenderness.        Feet:    Neurological: He is alert and oriented to person, place, and time. He has normal strength. No cranial nerve deficit or sensory deficit. He exhibits normal muscle tone. He displays no seizure activity. Coordination normal. GCS eye subscore is 4. GCS verbal subscore is 5. GCS motor subscore is 6.   Skin: Skin is warm, dry, intact, not diaphoretic and not pale. Capillary refill takes less than 2 seconds. No abrasion, No burn, No bruising and No ecchymosis   Psychiatric: His speech is normal and behavior is normal. Judgment and thought content normal.   Nursing note and vitals reviewed.    ,Type of Interpretation: ED Physician (Independently Interpreted).  Radiology Procedure Done: Left Ankle.  Interpretation: No fx seen    L foot - no fx seen.        Assessment:       1. Sprain of left ankle, unspecified ligament, initial encounter          Plan:         Sprain of left ankle, unspecified ligament,  initial encounter  -     X-Ray Foot Complete Left; Future; Expected date: 03/23/2023  -     X-Ray Ankle Complete Left; Future; Expected date: 03/23/2023  -     indomethacin (INDOCIN) 50 MG capsule; Take 1 capsule (50 mg total) by mouth 3 (three) times daily as needed.  Dispense: 30 capsule; Refill: 0  -     CRUTCHES FOR HOME USE     Please drink plenty of fluids.  Please get plenty of rest.  Please return here or go to the Emergency Department for any concerns or worsening of condition.  If you were prescribed a narcotic medication, do not drive or operate heavy equipment or machinery while taking these medications.  If you were not prescribed an anti-inflammatory medication, and if you do not have any history of stomach/intestinal ulcers, or kidney disease, or are not taking a blood thinner such as Coumadin, Plavix, Pradaxa, Eloquis, or Xaralta for example, it is OK to take over the counter Ibuprofen or Advil or Motrin or Aleve as directed.  Do not take these medications on an empty stomach.  Rest, ice, compression and elevation to the affected joint or limb as needed.    If you were given or placed in a brace, wear it until your follow up visit or recheck.  If you  smoke, please stop smoking.       If you were instructed to use crutches, use them for all ambulation.  You may walk on the injured leg with the crutches.  Use them to keep some of your weight off the injured leg until your follow up visit.    Please follow up with your primary care doctor or specialist as needed.    Primary Doctor No  None    You must understand that you have received treatment at an Urgent Care facility only, and that you may be  released before all of your medical problems are known or treated. Urgent Care facilities are not equipped to  handle life threatening emergencies. It is recommended that you seek care at an Emergency Department for  further evaluation of worsening or concerning symptoms, or possibly life threatening  conditions as  discussed.

## 2023-03-23 NOTE — LETTER
March 23, 2023  Ambrosio Ocampo  [address] [place] [address]                [place] [facility]  [address], [address]  [place] [address]  Phone: [phone]  Fax: [phone] Ambrosio Ocampo was seen and treated in our Urgent Care department on 3/23/2023. He may return to work in 2 - 3 days.      If you have any questions or concerns, please don't hesitate to call.        Sincerely,        Edouard Alcala MD

## 2024-07-16 PROBLEM — Z71.2 ENCOUNTER TO DISCUSS TEST RESULTS: Status: ACTIVE | Noted: 2024-07-16

## 2024-07-16 PROBLEM — D63.8 ANEMIA, CHRONIC DISEASE: Status: ACTIVE | Noted: 2024-07-16

## 2024-07-16 PROBLEM — D47.2 MGUS (MONOCLONAL GAMMOPATHY OF UNKNOWN SIGNIFICANCE): Status: ACTIVE | Noted: 2024-07-16

## 2024-07-16 PROBLEM — N18.9 CHRONIC KIDNEY DISEASE: Status: ACTIVE | Noted: 2024-07-16

## 2024-07-16 PROBLEM — K76.9 LIVER DISEASE: Status: ACTIVE | Noted: 2024-07-16

## 2024-07-16 PROBLEM — Z71.89 ENCOUNTER TO DISCUSS TREATMENT OPTIONS: Status: ACTIVE | Noted: 2024-07-16

## 2024-07-16 PROBLEM — Z71.9 ENCOUNTER FOR HEALTH EDUCATION: Status: ACTIVE | Noted: 2024-07-16

## 2024-08-14 PROBLEM — C90.00 MULTIPLE MYELOMA: Status: ACTIVE | Noted: 2024-08-14

## 2024-08-28 PROBLEM — Z51.11 ENCOUNTER FOR ANTINEOPLASTIC CHEMOTHERAPY: Status: ACTIVE | Noted: 2024-08-28

## 2024-09-02 ENCOUNTER — OFFICE VISIT (OUTPATIENT)
Dept: URGENT CARE | Facility: CLINIC | Age: 59
End: 2024-09-02
Payer: OTHER GOVERNMENT

## 2024-09-02 VITALS
WEIGHT: 164 LBS | TEMPERATURE: 98 F | HEART RATE: 89 BPM | SYSTOLIC BLOOD PRESSURE: 152 MMHG | BODY MASS INDEX: 24.29 KG/M2 | OXYGEN SATURATION: 98 % | HEIGHT: 69 IN | DIASTOLIC BLOOD PRESSURE: 88 MMHG | RESPIRATION RATE: 18 BRPM

## 2024-09-02 DIAGNOSIS — S69.92XA INJURY OF LEFT HAND, INITIAL ENCOUNTER: Primary | ICD-10-CM

## 2024-09-02 DIAGNOSIS — S60.222A CONTUSION OF LEFT HAND, INITIAL ENCOUNTER: ICD-10-CM

## 2024-09-02 PROCEDURE — 99214 OFFICE O/P EST MOD 30 MIN: CPT | Mod: S$GLB,,, | Performed by: NURSE PRACTITIONER

## 2024-09-02 PROCEDURE — 73130 X-RAY EXAM OF HAND: CPT | Mod: LT,S$GLB,, | Performed by: RADIOLOGY

## 2024-09-02 NOTE — PROGRESS NOTES
"Subjective:      Patient ID: Ambrosio Ocampo is a 59 y.o. male.    Vitals:  height is 5' 9" (1.753 m) and weight is 74.4 kg (164 lb 0.4 oz). His tympanic temperature is 98 °F (36.7 °C). His blood pressure is 152/88 (abnormal) and his pulse is 89. His respiration is 18 and oxygen saturation is 98%.     Chief Complaint: Hand Injury    Pt is comming in for left hand pain after a fall that occurred on Saturday.    Hand Injury   His dominant hand is their left hand. The incident occurred 2 days ago. The incident occurred at home. The injury mechanism was a fall. The pain is present in the left hand. The quality of the pain is described as aching. The pain does not radiate. The pain is at a severity of 7/10. The pain is moderate. The pain has been Constant since the incident. Pertinent negatives include no chest pain or numbness. The symptoms are aggravated by palpation and movement. He has tried nothing for the symptoms. The treatment provided no relief.       Constitution: Negative for activity change, appetite change and fatigue.   Cardiovascular:  Negative for chest pain.   Musculoskeletal:  Positive for pain (left hand- dorsum) and trauma.   Neurological:  Negative for numbness and tingling.      Objective:     Physical Exam   Constitutional: normal  HENT:   Head: Normocephalic.   Ears:   Right Ear: Tympanic membrane normal.   Left Ear: Tympanic membrane normal.   Nose: Nose normal.   Mouth/Throat: Mucous membranes are moist.   Cardiovascular: Normal rate.   Pulmonary/Chest: Effort normal and breath sounds normal.   Abdominal: Normal appearance and bowel sounds are normal.   Musculoskeletal: Normal range of motion.         General: Normal range of motion.        Hands:    Neurological: no focal deficit. He is alert and at baseline.   Skin: Skin is warm. Capillary refill takes less than 2 seconds.   Nursing note and vitals reviewed.      Assessment:     1. Injury of left hand, initial encounter    2. Contusion " of left hand, initial encounter        Plan:       Injury of left hand, initial encounter  -     XR HAND COMPLETE 3 VIEW LEFT; Future; Expected date: 09/02/2024    Contusion of left hand, initial encounter